# Patient Record
Sex: FEMALE | Race: BLACK OR AFRICAN AMERICAN | NOT HISPANIC OR LATINO | URBAN - METROPOLITAN AREA
[De-identification: names, ages, dates, MRNs, and addresses within clinical notes are randomized per-mention and may not be internally consistent; named-entity substitution may affect disease eponyms.]

---

## 2019-12-25 ENCOUNTER — EMERGENCY (EMERGENCY)
Facility: HOSPITAL | Age: 54
LOS: 1 days | Discharge: HOME | End: 2019-12-25
Attending: EMERGENCY MEDICINE | Admitting: EMERGENCY MEDICINE
Payer: MEDICAID

## 2019-12-25 VITALS
RESPIRATION RATE: 16 BRPM | DIASTOLIC BLOOD PRESSURE: 86 MMHG | HEART RATE: 85 BPM | SYSTOLIC BLOOD PRESSURE: 124 MMHG | TEMPERATURE: 98 F | OXYGEN SATURATION: 99 %

## 2019-12-25 LAB
ANION GAP SERPL CALC-SCNC: 13 MMO/L — SIGNIFICANT CHANGE UP (ref 7–14)
BUN SERPL-MCNC: 18 MG/DL — SIGNIFICANT CHANGE UP (ref 7–23)
CALCIUM SERPL-MCNC: 9.9 MG/DL — SIGNIFICANT CHANGE UP (ref 8.4–10.5)
CHLORIDE SERPL-SCNC: 102 MMOL/L — SIGNIFICANT CHANGE UP (ref 98–107)
CO2 SERPL-SCNC: 22 MMOL/L — SIGNIFICANT CHANGE UP (ref 22–31)
CREAT SERPL-MCNC: 1.06 MG/DL — SIGNIFICANT CHANGE UP (ref 0.5–1.3)
GLUCOSE SERPL-MCNC: 361 MG/DL — HIGH (ref 70–99)
POTASSIUM SERPL-MCNC: 3.7 MMOL/L — SIGNIFICANT CHANGE UP (ref 3.5–5.3)
POTASSIUM SERPL-SCNC: 3.7 MMOL/L — SIGNIFICANT CHANGE UP (ref 3.5–5.3)
SODIUM SERPL-SCNC: 137 MMOL/L — SIGNIFICANT CHANGE UP (ref 135–145)
TROPONIN T, HIGH SENSITIVITY: 6 NG/L — SIGNIFICANT CHANGE UP (ref ?–14)
TROPONIN T, HIGH SENSITIVITY: < 6 NG/L — SIGNIFICANT CHANGE UP (ref ?–14)

## 2019-12-25 PROCEDURE — 93010 ELECTROCARDIOGRAM REPORT: CPT

## 2019-12-25 PROCEDURE — 99283 EMERGENCY DEPT VISIT LOW MDM: CPT | Mod: 25

## 2019-12-25 NOTE — ED PROVIDER NOTE - PMH
No pertinent past medical history <<----- Click to add NO pertinent Past Medical History DM (diabetes mellitus)

## 2019-12-25 NOTE — ED ADULT TRIAGE NOTE - CHIEF COMPLAINT QUOTE
pt c/o L arm pain from the shoulder radiating down, since August, constant. sometimes radiating to the chest and under the arm. states she has been prescribed a "muscle relaxer" for the pain with minimal relief.  PMH- DM. did not take her meds today.

## 2019-12-25 NOTE — ED ADULT NURSE NOTE - NSIMPLEMENTINTERV_GEN_ALL_ED
Implemented All Universal Safety Interventions:  [place] to call system. Call bell, personal items and telephone within reach. Instruct patient to call for assistance. Room bathroom lighting operational. Non-slip footwear when patient is off stretcher. Physically safe environment: no spills, clutter or unnecessary equipment. Stretcher in lowest position, wheels locked, appropriate side rails in place.

## 2019-12-25 NOTE — ED PROVIDER NOTE - NSFOLLOWUPINSTRUCTIONS_ED_ALL_ED_FT
1. TAKE ALL MEDICATIONS AS DIRECTED.    2. FOR PAIN OR FEVER YOU CAN TAKE IBUPROFEN (MOTRIN, ADVIL) OR ACETAMINOPHEN (TYLENOL) AS NEEDED, AS DIRECTED ON PACKAGING.  3. FOLLOW UP WITH YOUR PRIMARY DOCTOR WITHIN 5 DAYS AS DIRECTED.  4. IF YOU HAD LABS OR IMAGING DONE, YOU WERE GIVEN COPIES OF ALL LABS AND/OR IMAGING RESULTS FROM YOUR ER VISIT--PLEASE TAKE THEM WITH YOU TO YOUR FOLLOW UP APPOINTMENTS.  5. IF NEEDED, CALL PATIENT ACCESS SERVICES AT [phone] TO FIND A PRIMARY CARE PHYSICIAN.  OR CALL [phone] TO MAKE AN APPOINTMENT WITH THE CLINIC.  6. RETURN TO THE ER FOR ANY WORSENING SYMPTOMS OR CONCERNS.

## 2019-12-25 NOTE — ED ADULT NURSE NOTE - OBJECTIVE STATEMENT
Pt received to room 23 a/o x 3 c/o left shoulder pain that radiates to left arm  x 4 months. pt states she may have fallen on it doesn't remember. Pt has been taking a muscle relaxer with no relief, doesn't know the name of it. pt has equal strength, motor and sensation to bilateral upper extremities. Pt states she has dec range of motion. Respirations even and unlabored. Lung sounds clear with equal chest rise bilaterally. ABD is soft, non tender, non distended with normal active bowel sounds No complaints of chest pain, headache, nausea, dizziness, vomiting  SOB, fever, chills verbalized.

## 2019-12-25 NOTE — ED PROVIDER NOTE - PATIENT PORTAL LINK FT
You can access the FollowMyHealth Patient Portal offered by [facility] by registering at the following website: [email or web address]. By joining [employer]’s FollowMyHealth portal, you will also be able to view your health information using other applications (apps) compatible with our system.

## 2019-12-25 NOTE — ED PROVIDER NOTE - PHYSICAL EXAMINATION
Gen: Well appearing in NAD  Head: NC/AT  Neck: trachea midline  Resp:  No distress  Ext: no deformities  Neuro:  A&O appears non focal  Skin:  Warm and dry as visualized  Psych:  Normal affect and mood   MSK: Some discomfort with ranging of shoulder. Pain is not reproducible by palpation.

## 2019-12-25 NOTE — ED PROVIDER NOTE - CLINICAL SUMMARY MEDICAL DECISION MAKING FREE TEXT BOX
53 y/o F w/ L arm pain most consistent with vertical radiculopathy. Duration of symptoms not related to ACS, however pt was told w/ abnormal EKG, which only showed nonspecific ST abnormalities. For pt's comfort, will ge trop to ensure not an atypical ACS. Otherwise, can follow up w/ PMD for outpatient MRI. 55 y/o F w/ L arm pain most consistent with vertical radiculopathy. Duration of symptoms not related to ACS, however pt was told w/ abnormal EKG, which only showed nonspecific ST abnormalities. For pt's comfort, will get trop to ensure not an atypical ACS. Otherwise, can follow up w/ PMD for outpatient MRI.

## 2019-12-25 NOTE — ED PROVIDER NOTE - OBJECTIVE STATEMENT
55 y/o F w/ no pertinent PMH presents to the ED c/o intermittent L arm pain [id number]. Denies any other acute complaints. NKDA. Vaccines UTD. 55 y/o F w/ PMH DM, presents to the ED c/o intermittent L arm pain [id number]. Denies any other acute complaints. NKDA. Vaccines UTD. 55 y/o F w/ PMH DM, presents to the ED c/o intermittent L arm pain [id number]. Denies any other acute complaints. NKDA. Vaccines UTD.  Non exertional and no chest pain

## 2020-12-01 NOTE — ED ADULT NURSE NOTE - NS ED TRIAGE CLINICAL UPGRADE
Deteriorating patient status - Patient was clinically upgraded due to deteriorating patient status.
No cyanosis, clubbing or edema

## 2021-03-19 NOTE — ED ADULT NURSE NOTE - CHIEF COMPLAINT QUOTE
TRANSFER - OUT REPORT:    Verbal report given to Ankur Huitron RN(name) on Abdulaziz Castrejon  being transferred to [facility](unit) for routine post - op       Report consisted of patients Situation, Background, Assessment and   Recommendations(SBAR). Information from the following report(s) SBAR, Kardex and Cardiac Rhythm NSR was reviewed with the receiving nurse. Lines:   Peripheral IV 03/19/21 Right Hand (Active)   Site Assessment Clean, dry, & intact 03/19/21 1305   Phlebitis Assessment 0 03/19/21 1305   Infiltration Assessment 0 03/19/21 1305   Dressing Status Clean, dry, & intact 03/19/21 1305   Dressing Type Transparent;Tape 03/19/21 1305   Hub Color/Line Status Patent; Infusing;Green 03/19/21 1305   Alcohol Cap Used No 03/19/21 0851        Opportunity for questions and clarification was provided.       Patient transported with:   O2 @ 2 liters  Registered Nurse pt c/o L arm pain from the shoulder radiating down, since August, constant. sometimes radiating to the chest and under the arm. states she has been prescribed a "muscle relaxer" for the pain with minimal relief.  PMH- DM. did not take her meds today.